# Patient Record
Sex: MALE | Race: WHITE | Employment: FULL TIME
[De-identification: names, ages, dates, MRNs, and addresses within clinical notes are randomized per-mention and may not be internally consistent; named-entity substitution may affect disease eponyms.]

---

## 2022-12-28 ENCOUNTER — TELEMEDICINE (OUTPATIENT)
Dept: FAMILY MEDICINE CLINIC | Facility: CLINIC | Age: 32
End: 2022-12-28
Payer: COMMERCIAL

## 2022-12-28 DIAGNOSIS — Z11.52 ENCOUNTER FOR SCREENING FOR COVID-19: ICD-10-CM

## 2022-12-28 DIAGNOSIS — F07.81 POST-CONCUSSION SYNDROME: ICD-10-CM

## 2022-12-28 DIAGNOSIS — G44.311 INTRACTABLE ACUTE POST-TRAUMATIC HEADACHE: Primary | ICD-10-CM

## 2022-12-28 DIAGNOSIS — R68.89 FLU-LIKE SYMPTOMS: ICD-10-CM

## 2022-12-28 LAB
EXP DATE SOLUTION: NORMAL
EXP DATE SWAB: NORMAL
EXPIRATION DATE: NORMAL
LOT NUMBER POC: NORMAL
LOT NUMBER SOLUTION: NORMAL
LOT NUMBER SWAB: NORMAL
QUICKVUE INFLUENZA TEST: NEGATIVE
SARS-COV-2 RNA, POC: NEGATIVE
VALID INTERNAL CONTROL, POC: YES

## 2022-12-28 PROCEDURE — 99214 OFFICE O/P EST MOD 30 MIN: CPT | Performed by: FAMILY MEDICINE

## 2022-12-28 PROCEDURE — 87804 INFLUENZA ASSAY W/OPTIC: CPT | Performed by: FAMILY MEDICINE

## 2022-12-28 PROCEDURE — 87635 SARS-COV-2 COVID-19 AMP PRB: CPT | Performed by: FAMILY MEDICINE

## 2022-12-28 ASSESSMENT — ENCOUNTER SYMPTOMS
ANAL BLEEDING: 0
NAUSEA: 1
BLOOD IN STOOL: 0
RECTAL PAIN: 0
APNEA: 0
VOMITING: 0

## 2022-12-28 ASSESSMENT — PATIENT HEALTH QUESTIONNAIRE - PHQ9
2. FEELING DOWN, DEPRESSED OR HOPELESS: 0
SUM OF ALL RESPONSES TO PHQ9 QUESTIONS 1 & 2: 0
SUM OF ALL RESPONSES TO PHQ QUESTIONS 1-9: 0
1. LITTLE INTEREST OR PLEASURE IN DOING THINGS: 0

## 2023-01-03 ENCOUNTER — OFFICE VISIT (OUTPATIENT)
Dept: FAMILY MEDICINE CLINIC | Facility: CLINIC | Age: 33
End: 2023-01-03
Payer: COMMERCIAL

## 2023-01-03 ENCOUNTER — HOSPITAL ENCOUNTER (OUTPATIENT)
Dept: GENERAL RADIOLOGY | Age: 33
Discharge: HOME | End: 2023-01-05
Payer: COMMERCIAL

## 2023-01-03 DIAGNOSIS — F41.9 ANXIETY: ICD-10-CM

## 2023-01-03 DIAGNOSIS — S01.01XD LACERATION OF OCCIPITAL REGION OF SCALP, SUBSEQUENT ENCOUNTER: ICD-10-CM

## 2023-01-03 DIAGNOSIS — M25.511 CHRONIC RIGHT SHOULDER PAIN: Primary | ICD-10-CM

## 2023-01-03 DIAGNOSIS — M25.511 CHRONIC RIGHT SHOULDER PAIN: ICD-10-CM

## 2023-01-03 DIAGNOSIS — G89.29 CHRONIC RIGHT SHOULDER PAIN: Primary | ICD-10-CM

## 2023-01-03 DIAGNOSIS — G89.29 CHRONIC RIGHT SHOULDER PAIN: ICD-10-CM

## 2023-01-03 PROCEDURE — 73030 X-RAY EXAM OF SHOULDER: CPT

## 2023-01-03 PROCEDURE — 99214 OFFICE O/P EST MOD 30 MIN: CPT | Performed by: FAMILY MEDICINE

## 2023-01-03 ASSESSMENT — ENCOUNTER SYMPTOMS
EYE REDNESS: 0
EYE DISCHARGE: 0
CHEST TIGHTNESS: 0
EYE ITCHING: 0
APNEA: 0

## 2023-01-03 ASSESSMENT — PATIENT HEALTH QUESTIONNAIRE - PHQ9
SUM OF ALL RESPONSES TO PHQ QUESTIONS 1-9: 0
1. LITTLE INTEREST OR PLEASURE IN DOING THINGS: 0
SUM OF ALL RESPONSES TO PHQ9 QUESTIONS 1 & 2: 0
SUM OF ALL RESPONSES TO PHQ QUESTIONS 1-9: 0
2. FEELING DOWN, DEPRESSED OR HOPELESS: 0

## 2023-01-03 NOTE — PROGRESS NOTES
[address] [place] [address]  _______________________________________  Emerald Barrera MD                 [address] [place] [address] Drive, [address]. [place], [address] [place] [address]                     Gloria Trevino [address]                                                                                    Phone: [phone]                                                                                    Fax: [phone]    Diana Yepez is a 33 y.o. male who is seen for evaluation of   Chief Complaint   Patient presents with    Suture / Staple Removal    Shoulder Pain     Right shoulder    Referral - General     Discuss possible referral to Neuro since he had a seizure when he fell. Psychiatrist not comfortable putting him on meds until he has been cleared       HPI:   Suture / Staple Removal  Improvement on treatment: wound healing well, sutures removed with no problems. Shoulder Pain   This is a chronic problem. History of extremity trauma: last few years, unsure of trauma history, has pain and limited use, had xrays in 2016, needs updated films and refer to Ortho. Anxiety  Presents for follow-up visit. Episode frequency: see below, has anxiety issues and stress management problems, has been self medicating with ETOH, trying to get off this, see details below. Chief Complaint   Patient presents with    Suture / Staple Removal    Shoulder Pain     Right shoulder    Referral - General     Discuss possible referral to Neuro since he had a seizure when he fell. Psychiatrist not comfortable putting him on meds until he has been cleared         Review of Systems:    Review of Systems   Constitutional:  Negative for activity change, appetite change, chills and fatigue. HENT:  Negative for congestion, dental problem, ear discharge and ear pain. Eyes:  Negative for discharge, redness and itching. Respiratory:  Negative for apnea and chest tightness.     Endocrine: Negative for cold intolerance, heat intolerance and polydipsia. Genitourinary: Negative. Negative for frequency. History:  Past Medical History:   Diagnosis Date    Seizures ([address]        History reviewed. No pertinent surgical history. Family History   Problem Relation Age of Onset    No Known Problems Father     No Known Problems Mother        Social History     Tobacco Use    Smoking status: Never    Smokeless tobacco: Never   Substance Use Topics    Alcohol use: Yes     Alcohol/week: 5.0 standard drinks       No Known Allergies    No current outpatient medications on file. No current facility-administered medications for this visit. Vitals: There were no vitals taken for this visit. Physical Exam:  Physical Exam  Vitals and nursing note reviewed. Constitutional:       Appearance: Normal appearance. He is not ill-appearing or diaphoretic. HENT:      Head: Normocephalic and atraumatic. Nose: Nose normal.   Eyes:      Pupils: Pupils are equal, round, and reactive to light. Cardiovascular:      Rate and Rhythm: Normal rate and regular rhythm. Pulses: Normal pulses. Heart sounds: Normal heart sounds. Pulmonary:      Effort: Pulmonary effort is normal.      Breath sounds: Normal breath sounds. Musculoskeletal:         General: Tenderness present. No swelling. Cervical back: Normal range of motion and neck supple. Comments: Pain in right shoulder, sor eot touch and limited ROM active or passive   Skin:     General: Skin is warm and dry. Findings: No erythema or rash. Neurological:      General: No focal deficit present. Mental Status: He is alert and oriented to person, place, and time. Mental status is at baseline. Psychiatric:         Mood and Affect: Mood normal.     Assessment/Plan:     ICD-10-CM    1. Chronic right shoulder pain  M25.511 XR SHOULDER RIGHT (MIN 2 VIEWS)    G89.29 Ambulatory referral to Orthopedic Surgery      2.  Laceration of occipital region of scalp, subsequent encounter  S01. 01XD       3. Anxiety  F41.9         Pt with anxiety and stress issues last few months, has been drinking more and  more ETOH to compensate. Has seen a psych for virtual visit who was concerned about the withdrawal seizure he had. Unlikely to change anything with neuro refer or eval for simple ETOH withdrawal seizure. Encoudrage him to talk with psych about going ahead with meds so he does not self medicate with alcohol in future. Laceration of scalp, healing well, sutures removed. Shoulder pain for 5-6 yers, needs updated xrays and not responding to PT or conservative treat in past, refer to Ortho for further evaluation.        Gilberto Clark MD